# Patient Record
Sex: MALE | Race: BLACK OR AFRICAN AMERICAN | NOT HISPANIC OR LATINO | Employment: UNEMPLOYED | URBAN - METROPOLITAN AREA
[De-identification: names, ages, dates, MRNs, and addresses within clinical notes are randomized per-mention and may not be internally consistent; named-entity substitution may affect disease eponyms.]

---

## 2017-06-01 ENCOUNTER — HOSPITAL ENCOUNTER (EMERGENCY)
Facility: HOSPITAL | Age: 62
Discharge: HOME | End: 2017-06-01
Attending: EMERGENCY MEDICINE
Payer: MEDICAID

## 2017-06-01 VITALS
TEMPERATURE: 98 F | WEIGHT: 291 LBS | RESPIRATION RATE: 20 BRPM | DIASTOLIC BLOOD PRESSURE: 60 MMHG | BODY MASS INDEX: 38.57 KG/M2 | OXYGEN SATURATION: 97 % | HEIGHT: 73 IN | HEART RATE: 72 BPM | SYSTOLIC BLOOD PRESSURE: 156 MMHG

## 2017-06-01 DIAGNOSIS — T14.8XXA SPLINTER IN SKIN: Primary | ICD-10-CM

## 2017-06-01 PROCEDURE — 25000003 PHARM REV CODE 250: Performed by: EMERGENCY MEDICINE

## 2017-06-01 PROCEDURE — 99283 EMERGENCY DEPT VISIT LOW MDM: CPT | Mod: 25

## 2017-06-01 PROCEDURE — 64450 NJX AA&/STRD OTHER PN/BRANCH: CPT | Mod: F3

## 2017-06-01 RX ORDER — HYDROCODONE BITARTRATE AND ACETAMINOPHEN 10; 325 MG/1; MG/1
1 TABLET ORAL EVERY 6 HOURS PRN
Qty: 12 TABLET | Refills: 0 | Status: SHIPPED | OUTPATIENT
Start: 2017-06-01 | End: 2018-11-25 | Stop reason: ALTCHOICE

## 2017-06-01 RX ORDER — LIDOCAINE HYDROCHLORIDE 10 MG/ML
5 INJECTION INFILTRATION; PERINEURAL
Status: COMPLETED | OUTPATIENT
Start: 2017-06-01 | End: 2017-06-01

## 2017-06-01 RX ORDER — CEPHALEXIN 250 MG/1
250 CAPSULE ORAL 4 TIMES DAILY
Qty: 28 CAPSULE | Refills: 0 | Status: SHIPPED | OUTPATIENT
Start: 2017-06-01 | End: 2017-06-08

## 2017-06-01 RX ADMIN — LIDOCAINE HYDROCHLORIDE 5 ML: 10 INJECTION, SOLUTION INFILTRATION; PERINEURAL at 01:06

## 2017-06-01 RX ADMIN — BACITRACIN, NEOMYCIN, POLYMYXIN B 1 EACH: 400; 3.5; 5 OINTMENT TOPICAL at 02:06
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
## 2018-10-30 ENCOUNTER — HOSPITAL ENCOUNTER (EMERGENCY)
Facility: HOSPITAL | Age: 63
Discharge: HOME | End: 2018-10-30
Attending: FAMILY MEDICINE
Payer: MEDICAID

## 2018-10-30 VITALS
HEART RATE: 74 BPM | HEIGHT: 73 IN | BODY MASS INDEX: 38.13 KG/M2 | RESPIRATION RATE: 22 BRPM | TEMPERATURE: 99 F | WEIGHT: 287.69 LBS | DIASTOLIC BLOOD PRESSURE: 78 MMHG | SYSTOLIC BLOOD PRESSURE: 187 MMHG | OXYGEN SATURATION: 100 %

## 2018-10-30 DIAGNOSIS — R06.02 SHORTNESS OF BREATH: ICD-10-CM

## 2018-10-30 DIAGNOSIS — I16.0 HYPERTENSIVE URGENCY: ICD-10-CM

## 2018-10-30 DIAGNOSIS — I50.9 ACUTE CONGESTIVE HEART FAILURE, UNSPECIFIED HEART FAILURE TYPE: Primary | ICD-10-CM

## 2018-10-30 DIAGNOSIS — R07.9 CHEST PAIN: ICD-10-CM

## 2018-10-30 LAB
ALBUMIN SERPL BCP-MCNC: 4.3 G/DL
ALP SERPL-CCNC: 107 U/L
ALT SERPL W/O P-5'-P-CCNC: 12 U/L
ANION GAP SERPL CALC-SCNC: 13 MMOL/L
AST SERPL-CCNC: 18 U/L
BASOPHILS # BLD AUTO: 0.02 K/UL
BASOPHILS NFR BLD: 0.3 %
BILIRUB SERPL-MCNC: 0.6 MG/DL
BUN SERPL-MCNC: 13 MG/DL
CALCIUM SERPL-MCNC: 9.1 MG/DL
CHLORIDE SERPL-SCNC: 104 MMOL/L
CO2 SERPL-SCNC: 23 MMOL/L
CREAT SERPL-MCNC: 0.98 MG/DL
D DIMER PPP FEU-MCNC: 290 NG/ML
DIFFERENTIAL METHOD: ABNORMAL
EOSINOPHIL # BLD AUTO: 0.3 K/UL
EOSINOPHIL NFR BLD: 3.8 %
ERYTHROCYTE [DISTWIDTH] IN BLOOD BY AUTOMATED COUNT: 16.2 %
EST. GFR  (AFRICAN AMERICAN): >60 ML/MIN/1.73 M^2
EST. GFR  (NON AFRICAN AMERICAN): >60 ML/MIN/1.73 M^2
GLUCOSE SERPL-MCNC: 150 MG/DL
HCT VFR BLD AUTO: 40.2 %
HGB BLD-MCNC: 13 G/DL
INR PPP: 1.1
LACTATE SERPL-SCNC: 1.4 MMOL/L
LYMPHOCYTES # BLD AUTO: 3.6 K/UL
LYMPHOCYTES NFR BLD: 53.6 %
MCH RBC QN AUTO: 23.4 PG
MCHC RBC AUTO-ENTMCNC: 32.3 G/DL
MCV RBC AUTO: 72 FL
MONOCYTES # BLD AUTO: 0.6 K/UL
MONOCYTES NFR BLD: 8.8 %
NEUTROPHILS # BLD AUTO: 2.2 K/UL
NEUTROPHILS NFR BLD: 33.3 %
NT-PROBNP: 1800 PG/ML
PLATELET # BLD AUTO: 317 K/UL
PMV BLD AUTO: 10.1 FL
POTASSIUM SERPL-SCNC: 3.5 MMOL/L
PROT SERPL-MCNC: 7.8 G/DL
PROTHROMBIN TIME: 12 SEC
RBC # BLD AUTO: 5.56 M/UL
SODIUM SERPL-SCNC: 140 MMOL/L
TROPONIN I SERPL DL<=0.01 NG/ML-MCNC: 0.01 NG/ML
WBC # BLD AUTO: 6.62 K/UL

## 2018-10-30 PROCEDURE — 83605 ASSAY OF LACTIC ACID: CPT

## 2018-10-30 PROCEDURE — 99285 EMERGENCY DEPT VISIT HI MDM: CPT | Mod: 25

## 2018-10-30 PROCEDURE — 63600175 PHARM REV CODE 636 W HCPCS: Performed by: FAMILY MEDICINE

## 2018-10-30 PROCEDURE — 83880 ASSAY OF NATRIURETIC PEPTIDE: CPT

## 2018-10-30 PROCEDURE — 87040 BLOOD CULTURE FOR BACTERIA: CPT | Mod: 59

## 2018-10-30 PROCEDURE — 93005 ELECTROCARDIOGRAM TRACING: CPT

## 2018-10-30 PROCEDURE — 80053 COMPREHEN METABOLIC PANEL: CPT

## 2018-10-30 PROCEDURE — 96374 THER/PROPH/DIAG INJ IV PUSH: CPT

## 2018-10-30 PROCEDURE — 96375 TX/PRO/DX INJ NEW DRUG ADDON: CPT

## 2018-10-30 PROCEDURE — 25000003 PHARM REV CODE 250: Performed by: FAMILY MEDICINE

## 2018-10-30 PROCEDURE — 85610 PROTHROMBIN TIME: CPT

## 2018-10-30 PROCEDURE — 85025 COMPLETE CBC W/AUTO DIFF WBC: CPT

## 2018-10-30 PROCEDURE — 84484 ASSAY OF TROPONIN QUANT: CPT

## 2018-10-30 PROCEDURE — 93010 ELECTROCARDIOGRAM REPORT: CPT | Mod: ,,, | Performed by: INTERNAL MEDICINE

## 2018-10-30 PROCEDURE — 85379 FIBRIN DEGRADATION QUANT: CPT

## 2018-10-30 PROCEDURE — 96376 TX/PRO/DX INJ SAME DRUG ADON: CPT

## 2018-10-30 RX ORDER — ENALAPRILAT 1.25 MG/ML
2.5 INJECTION INTRAVENOUS
Status: COMPLETED | OUTPATIENT
Start: 2018-10-30 | End: 2018-10-30

## 2018-10-30 RX ORDER — FUROSEMIDE 10 MG/ML
40 INJECTION INTRAMUSCULAR; INTRAVENOUS
Status: COMPLETED | OUTPATIENT
Start: 2018-10-30 | End: 2018-10-30

## 2018-10-30 RX ORDER — ASPIRIN 325 MG
325 TABLET ORAL
Status: COMPLETED | OUTPATIENT
Start: 2018-10-30 | End: 2018-10-30

## 2018-10-30 RX ORDER — FUROSEMIDE 40 MG/1
40 TABLET ORAL DAILY
Qty: 5 TABLET | Refills: 0 | Status: SHIPPED | OUTPATIENT
Start: 2018-10-30 | End: 2018-11-25 | Stop reason: ALTCHOICE

## 2018-10-30 RX ADMIN — ENALAPRILAT 2.5 MG: 1.25 INJECTION, SOLUTION INTRAVENOUS at 04:10

## 2018-10-30 RX ADMIN — FUROSEMIDE 40 MG: 10 INJECTION, SOLUTION INTRAMUSCULAR; INTRAVENOUS at 04:10

## 2018-10-30 RX ADMIN — ASPIRIN 325 MG ORAL TABLET 325 MG: 325 PILL ORAL at 04:10

## 2018-10-30 RX ADMIN — FUROSEMIDE 40 MG: 10 INJECTION, SOLUTION INTRAMUSCULAR; INTRAVENOUS at 05:10

## 2018-10-30 RX ADMIN — NITROGLYCERIN 1 INCH: 20 OINTMENT TOPICAL at 05:10

## 2018-10-30 NOTE — ED PROVIDER NOTES
Encounter Date: 10/30/2018       History     Chief Complaint   Patient presents with    Cough     c/o cough that started tonight after waking up. Cough is productive.      63-year-old male presents with chief complaint of cough for the last 2 days.  Reports has been coughing up some cold over last 1 day.  Patient also reports had a brief episode of chest pain.  Reports does have a history of diabetes and hypertension.  Upon further questioning does report has episodes of sleep apnea.          Review of patient's allergies indicates:  No Known Allergies  Past Medical History:   Diagnosis Date    Diabetes mellitus     High cholesterol     Hypertension      History reviewed. No pertinent surgical history.  History reviewed. No pertinent family history.  Social History     Tobacco Use    Smoking status: Never Smoker   Substance Use Topics    Alcohol use: Yes     Comment: socially    Drug use: No     Review of Systems   Constitutional: Negative for chills and fever.   Respiratory: Positive for cough and shortness of breath. Negative for wheezing.    Cardiovascular: Positive for chest pain. Negative for leg swelling.   Gastrointestinal: Negative for abdominal pain, nausea and vomiting.   All other systems reviewed and are negative.      Physical Exam     Initial Vitals [10/30/18 0407]   BP Pulse Resp Temp SpO2   (!) 240/109 106 20 99.1 °F (37.3 °C) 95 %      MAP       --         Physical Exam    Nursing note and vitals reviewed.  Constitutional: He appears well-developed and well-nourished.   HENT:   Head: Normocephalic and atraumatic.   Eyes: Conjunctivae and EOM are normal. Pupils are equal, round, and reactive to light.   Neck: Normal range of motion. Neck supple.   Cardiovascular: Normal rate, regular rhythm and intact distal pulses. Exam reveals gallop.    Pulmonary/Chest: Breath sounds normal.   Abdominal: Soft. Bowel sounds are normal.   Musculoskeletal: Normal range of motion.   Neurological: He is alert  and oriented to person, place, and time. He has normal strength. GCS score is 15. GCS eye subscore is 4. GCS verbal subscore is 5. GCS motor subscore is 6.   Skin: Skin is warm. Capillary refill takes less than 2 seconds.   Psychiatric: He has a normal mood and affect. His behavior is normal. Thought content normal.         ED Course   Critical Care  Date/Time: 11/5/2018 11:11 PM  Performed by: Jose Magdaleno MD  Authorized by: Jose Magdaleno MD   Direct patient critical care time: 20 minutes  Additional history critical care time: 5 minutes  Documentation critical care time: 7 minutes  Total critical care time (exclusive of procedural time) : 32 minutes        Labs Reviewed   CBC W/ AUTO DIFFERENTIAL - Abnormal; Notable for the following components:       Result Value    Hemoglobin 13.0 (*)     MCV 72 (*)     MCH 23.4 (*)     RDW 16.2 (*)     Gran% 33.3 (*)     Lymph% 53.6 (*)     All other components within normal limits   COMPREHENSIVE METABOLIC PANEL - Abnormal; Notable for the following components:    Glucose 150 (*)     All other components within normal limits   NT-PRO NATRIURETIC PEPTIDE - Abnormal; Notable for the following components:    NT-proBNP 1800 (*)     All other components within normal limits   D DIMER - Abnormal; Notable for the following components:    D-Dimer 290 (*)     All other components within normal limits   CULTURE, BLOOD   CULTURE, BLOOD   TROPONIN I   PROTIME-INR   LACTIC ACID, PLASMA   TROPONIN I     EKG Readings: (Independently Interpreted)   Initial Reading: No STEMI.   Sinus rhythm with first-degree AV block at 87 beats AL interval 212 milliseconds normal QRS left axis deviation with LVH no acute ST segment changes noted       Imaging Results          X-Ray Chest AP Portable (In process)                  Medical Decision Making:   Initial Assessment:   Wan obese black male no apparent distress presenting with a dry nonproductive cough.  Differential Diagnosis:   Pneumonia,  bronchitis, congestive heart failure, asthma.  Clinical Tests:   Lab Tests: Ordered and Reviewed  The following lab test(s) were unremarkable: CBC, CMP and BNP       <> Summary of Lab: Elevated BNP with a normal troponin  Radiological Study: Ordered and Reviewed                      Clinical Impression:   The primary encounter diagnosis was Acute congestive heart failure, unspecified heart failure type. Diagnoses of Chest pain, Shortness of breath, and Hypertensive urgency were also pertinent to this visit.                             Jose Magdaleno MD  11/05/18 [id number]

## 2018-11-04 LAB
BACTERIA BLD CULT: NORMAL
BACTERIA BLD CULT: NORMAL

## 2018-11-25 ENCOUNTER — HOSPITAL ENCOUNTER (EMERGENCY)
Facility: HOSPITAL | Age: 63
Discharge: HOME | End: 2018-11-25
Attending: EMERGENCY MEDICINE
Payer: MEDICAID

## 2018-11-25 VITALS
OXYGEN SATURATION: 99 % | RESPIRATION RATE: 20 BRPM | TEMPERATURE: 99 F | SYSTOLIC BLOOD PRESSURE: 192 MMHG | HEART RATE: 94 BPM | WEIGHT: 281 LBS | DIASTOLIC BLOOD PRESSURE: 82 MMHG | BODY MASS INDEX: 38.06 KG/M2 | HEIGHT: 72 IN

## 2018-11-25 DIAGNOSIS — I10 ESSENTIAL HYPERTENSION: Primary | ICD-10-CM

## 2018-11-25 DIAGNOSIS — R00.2 PALPITATIONS: ICD-10-CM

## 2018-11-25 DIAGNOSIS — R07.9 CHEST PAIN: ICD-10-CM

## 2018-11-25 LAB
ALBUMIN SERPL BCP-MCNC: 4.5 G/DL
ALP SERPL-CCNC: 84 U/L
ALT SERPL W/O P-5'-P-CCNC: 18 U/L
ANION GAP SERPL CALC-SCNC: 8 MMOL/L
AST SERPL-CCNC: 22 U/L
BASOPHILS # BLD AUTO: 0.03 K/UL
BASOPHILS NFR BLD: 0.5 %
BILIRUB SERPL-MCNC: 0.3 MG/DL
BUN SERPL-MCNC: 12 MG/DL
CALCIUM SERPL-MCNC: 9.4 MG/DL
CHLORIDE SERPL-SCNC: 103 MMOL/L
CO2 SERPL-SCNC: 30 MMOL/L
CREAT SERPL-MCNC: 1.05 MG/DL
DIFFERENTIAL METHOD: ABNORMAL
EOSINOPHIL # BLD AUTO: 0.2 K/UL
EOSINOPHIL NFR BLD: 3.3 %
ERYTHROCYTE [DISTWIDTH] IN BLOOD BY AUTOMATED COUNT: 16.9 %
EST. GFR  (AFRICAN AMERICAN): >60 ML/MIN/1.73 M^2
EST. GFR  (NON AFRICAN AMERICAN): >60 ML/MIN/1.73 M^2
ETHANOL SERPL-MCNC: 14 MG/DL
GLUCOSE SERPL-MCNC: 152 MG/DL
HCT VFR BLD AUTO: 39.9 %
HGB BLD-MCNC: 13 G/DL
LYMPHOCYTES # BLD AUTO: 4 K/UL
LYMPHOCYTES NFR BLD: 60.5 %
MAGNESIUM SERPL-MCNC: 1.7 MG/DL
MCH RBC QN AUTO: 23.6 PG
MCHC RBC AUTO-ENTMCNC: 32.6 G/DL
MCV RBC AUTO: 73 FL
MONOCYTES # BLD AUTO: 0.7 K/UL
MONOCYTES NFR BLD: 9.8 %
NEUTROPHILS # BLD AUTO: 1.7 K/UL
NEUTROPHILS NFR BLD: 25.9 %
NT-PROBNP: 596 PG/ML
PLATELET # BLD AUTO: 287 K/UL
PMV BLD AUTO: 9.4 FL
POTASSIUM SERPL-SCNC: 3.7 MMOL/L
PROT SERPL-MCNC: 8 G/DL
RBC # BLD AUTO: 5.5 M/UL
SODIUM SERPL-SCNC: 141 MMOL/L
TROPONIN I SERPL DL<=0.01 NG/ML-MCNC: 0.02 NG/ML
TSH SERPL DL<=0.005 MIU/L-ACNC: 2.42 UIU/ML
WBC # BLD AUTO: 6.63 K/UL

## 2018-11-25 PROCEDURE — 80053 COMPREHEN METABOLIC PANEL: CPT

## 2018-11-25 PROCEDURE — 93010 ELECTROCARDIOGRAM REPORT: CPT | Mod: ,,, | Performed by: STUDENT IN AN ORGANIZED HEALTH CARE EDUCATION/TRAINING PROGRAM

## 2018-11-25 PROCEDURE — 93005 ELECTROCARDIOGRAM TRACING: CPT

## 2018-11-25 PROCEDURE — 84443 ASSAY THYROID STIM HORMONE: CPT

## 2018-11-25 PROCEDURE — 25000003 PHARM REV CODE 250: Performed by: EMERGENCY MEDICINE

## 2018-11-25 PROCEDURE — 99285 EMERGENCY DEPT VISIT HI MDM: CPT | Mod: 25

## 2018-11-25 PROCEDURE — 94760 N-INVAS EAR/PLS OXIMETRY 1: CPT

## 2018-11-25 PROCEDURE — 83880 ASSAY OF NATRIURETIC PEPTIDE: CPT

## 2018-11-25 PROCEDURE — 84484 ASSAY OF TROPONIN QUANT: CPT

## 2018-11-25 PROCEDURE — 85025 COMPLETE CBC W/AUTO DIFF WBC: CPT

## 2018-11-25 PROCEDURE — 80320 DRUG SCREEN QUANTALCOHOLS: CPT

## 2018-11-25 PROCEDURE — 96360 HYDRATION IV INFUSION INIT: CPT

## 2018-11-25 PROCEDURE — 83735 ASSAY OF MAGNESIUM: CPT

## 2018-11-25 RX ORDER — CLONIDINE HYDROCHLORIDE 0.1 MG/1
0.1 TABLET ORAL
Status: COMPLETED | OUTPATIENT
Start: 2018-11-25 | End: 2018-11-25

## 2018-11-25 RX ORDER — METOPROLOL TARTRATE 1 MG/ML
5 INJECTION, SOLUTION INTRAVENOUS
Status: DISCONTINUED | OUTPATIENT
Start: 2018-11-25 | End: 2018-11-25 | Stop reason: HOSPADM

## 2018-11-25 RX ORDER — ASPIRIN 325 MG
325 TABLET ORAL
Status: COMPLETED | OUTPATIENT
Start: 2018-11-25 | End: 2018-11-25

## 2018-11-25 RX ORDER — LOSARTAN POTASSIUM 100 MG/1
100 TABLET ORAL DAILY
Qty: 30 TABLET | Refills: 0 | Status: SHIPPED | OUTPATIENT
Start: 2018-11-25 | End: 2019-11-05

## 2018-11-25 RX ORDER — ATORVASTATIN CALCIUM 40 MG/1
40 TABLET, FILM COATED ORAL DAILY
COMMUNITY

## 2018-11-25 RX ADMIN — ASPIRIN 325 MG ORAL TABLET 325 MG: 325 PILL ORAL at 02:11

## 2018-11-25 RX ADMIN — SODIUM CHLORIDE 500 ML: 0.9 INJECTION, SOLUTION INTRAVENOUS at 02:11

## 2018-11-25 RX ADMIN — CLONIDINE HYDROCHLORIDE 0.1 MG: 0.1 TABLET ORAL at 02:11

## 2018-11-25 NOTE — ED NOTES
Metoprolol 5 mg IV push held at this time pending discussion of pts trending vitals with MD. Heart rate and blood pressure currently trending down. Will discuss vital signs after new set is obtained at 0230 with MD in order to determine whether medication is to be administered.

## 2018-11-25 NOTE — ED PROVIDER NOTES
Encounter Date: 11/25/2018       History     Chief Complaint   Patient presents with    Palpitations     Patient states he woke up at 0130 and it feels like his heart is racing.   He denies any CP or SOB.      Chief Complaint:  Heart racing and high blood pressure      The patient is a 63 y.o. male presenting with heart racing high blood pressure.  Patient reports he is out of his blood pressure medicine and he has been drinking a lot alcohol recently.  Patient feels like he might be going through withdrawal.    Associated symptoms include heart racing and anxiety.    No exacerbating or alleviating factors.     Denies chest pain,.  Fever/chills/nausea/vomiting/diarrhea    Patient has a past medical history of Diabetes mellitus, High cholesterol, and Hypertension.  Patient has no past surgical history on file.          Review of patient's allergies indicates:  No Known Allergies  Past Medical History:   Diagnosis Date    Diabetes mellitus     High cholesterol     Hypertension      History reviewed. No pertinent surgical history.  History reviewed. No pertinent family history.  Social History     Tobacco Use    Smoking status: Never Smoker    Smokeless tobacco: Never Used   Substance Use Topics    Alcohol use: Yes     Alcohol/week: 2.4 oz     Types: 4 Shots of liquor per week     Comment: daily    Drug use: No     Review of Systems   Cardiovascular: Positive for palpitations.   Psychiatric/Behavioral: The patient is nervous/anxious.    All other systems reviewed and are negative.      Physical Exam     Initial Vitals [11/25/18 0203]   BP Pulse Resp Temp SpO2   (!) 219/102 (!) 135 20 98.5 °F (36.9 °C) 99 %      MAP       --         Physical Exam    Nursing note and vitals reviewed.  Constitutional: He appears well-developed. He appears distressed.   Anxious appearing   HENT:   Head: Normocephalic and atraumatic.   Right Ear: External ear normal.   Left Ear: External ear normal.   Nose: Nose normal.    Mouth/Throat: Oropharynx is clear and moist.   Eyes: Conjunctivae and EOM are normal. Pupils are equal, round, and reactive to light.   Neck: Normal range of motion. Neck supple.   Cardiovascular: Regular rhythm, normal heart sounds and intact distal pulses.   Tachycardic rate of 110   Pulmonary/Chest: Breath sounds normal.   Abdominal: Soft. Bowel sounds are normal.   Musculoskeletal: Normal range of motion.   Neurological: He is alert. GCS score is 15. GCS eye subscore is 4. GCS verbal subscore is 5. GCS motor subscore is 6.   Skin: Skin is warm. Capillary refill takes 2 to 3 seconds.         ED Course   Procedures  Labs Reviewed   CBC W/ AUTO DIFFERENTIAL - Abnormal; Notable for the following components:       Result Value    Hemoglobin 13.0 (*)     Hematocrit 39.9 (*)     MCV 73 (*)     MCH 23.6 (*)     RDW 16.9 (*)     Gran # (ANC) 1.7 (*)     Gran% 25.9 (*)     Lymph% 60.5 (*)     All other components within normal limits   COMPREHENSIVE METABOLIC PANEL - Abnormal; Notable for the following components:    CO2 30 (*)     Glucose 152 (*)     All other components within normal limits   ALCOHOL,MEDICAL (ETHANOL) - Abnormal; Notable for the following components:    Alcohol, Medical, Serum 14 (*)     All other components within normal limits   TROPONIN I   TSH   MAGNESIUM   NT-PRO NATRIURETIC PEPTIDE   TROPONIN I   DRUG SCREEN PANEL, URINE EMERGENCY          Imaging Results          X-Ray Chest AP Portable (In process)                       Results for orders placed or performed during the hospital encounter of 11/25/18   CBC auto differential   Result Value Ref Range    WBC 6.63 3.90 - 12.70 K/uL    RBC 5.50 4.60 - 6.20 M/uL    Hemoglobin 13.0 (L) 14.0 - 18.0 g/dL    Hematocrit 39.9 (L) 40.0 - 54.0 %    MCV 73 (L) 82 - 98 fL    MCH 23.6 (L) 27.0 - 31.0 pg    MCHC 32.6 32.0 - 36.0 g/dL    RDW 16.9 (H) 11.5 - 14.5 %    Platelets 287 150 - 350 K/uL    MPV 9.4 9.2 - 12.9 fL    Gran # (ANC) 1.7 (L) 1.8 - 7.7 K/uL     Lymph # 4.0 1.0 - 4.8 K/uL    Mono # 0.7 0.3 - 1.0 K/uL    Eos # 0.2 0.0 - 0.5 K/uL    Baso # 0.03 0.00 - 0.20 K/uL    Gran% 25.9 (L) 38.0 - 73.0 %    Lymph% 60.5 (H) 18.0 - 48.0 %    Mono% 9.8 4.0 - 15.0 %    Eosinophil% 3.3 0.0 - 8.0 %    Basophil% 0.5 0.0 - 1.9 %    Differential Method Automated    Comprehensive metabolic panel   Result Value Ref Range    Sodium 141 136 - 145 mmol/L    Potassium 3.7 3.5 - 5.1 mmol/L    Chloride 103 95 - 110 mmol/L    CO2 30 (H) 23 - 29 mmol/L    Glucose 152 (H) 70 - 110 mg/dL    BUN, Bld 12 2 - 20 mg/dL    Creatinine 1.05 0.50 - 1.40 mg/dL    Calcium 9.4 8.7 - 10.5 mg/dL    Total Protein 8.0 6.0 - 8.4 g/dL    Albumin 4.5 3.5 - 5.2 g/dL    Total Bilirubin 0.3 0.1 - 1.0 mg/dL    Alkaline Phosphatase 84 38 - 126 U/L    AST 22 15 - 46 U/L    ALT 18 10 - 44 U/L    Anion Gap 8 8 - 16 mmol/L    eGFR if African American >60.0 >60 mL/min/1.73 m^2    eGFR if non African American >60.0 >60 mL/min/1.73 m^2   Troponin I #1   Result Value Ref Range    Troponin I 0.016 0.012 - 0.034 ng/mL   TSH   Result Value Ref Range    TSH 2.420 0.400 - 4.000 uIU/mL   Magnesium   Result Value Ref Range    Magnesium 1.7 1.6 - 2.6 mg/dL   Ethanol   Result Value Ref Range    Alcohol, Medical, Serum 14 (H) <10 mg/dL   NT-Pro Natriuretic Peptide   Result Value Ref Range    NT-proBNP 596 5 - 900 pg/mL                         Clinical Impression:   The primary encounter diagnosis was Essential hypertension. Diagnoses of Chest pain and Palpitations were also pertinent to this visit.                             Dev Desai MD  11/25/18 0421

## 2019-06-30 ENCOUNTER — HOSPITAL ENCOUNTER (EMERGENCY)
Facility: HOSPITAL | Age: 64
Discharge: HOME | End: 2019-06-30
Attending: SURGERY
Payer: MEDICARE

## 2019-06-30 VITALS
TEMPERATURE: 98 F | DIASTOLIC BLOOD PRESSURE: 87 MMHG | RESPIRATION RATE: 6 BRPM | SYSTOLIC BLOOD PRESSURE: 178 MMHG | BODY MASS INDEX: 38.6 KG/M2 | OXYGEN SATURATION: 96 % | HEART RATE: 65 BPM | WEIGHT: 285 LBS | HEIGHT: 72 IN

## 2019-06-30 DIAGNOSIS — I49.1 PAC (PREMATURE ATRIAL CONTRACTION): Primary | ICD-10-CM

## 2019-06-30 DIAGNOSIS — R00.2 PALPITATIONS: ICD-10-CM

## 2019-06-30 LAB
ALBUMIN SERPL BCP-MCNC: 4.1 G/DL (ref 3.5–5.2)
ALP SERPL-CCNC: 79 U/L (ref 38–126)
ALT SERPL W/O P-5'-P-CCNC: 15 U/L (ref 10–44)
ANION GAP SERPL CALC-SCNC: 10 MMOL/L (ref 8–16)
AST SERPL-CCNC: 24 U/L (ref 15–46)
BASOPHILS # BLD AUTO: 0.01 K/UL (ref 0–0.2)
BASOPHILS NFR BLD: 0.2 % (ref 0–1.9)
BILIRUB SERPL-MCNC: 0.4 MG/DL (ref 0.1–1)
BUN SERPL-MCNC: 10 MG/DL (ref 2–20)
CALCIUM SERPL-MCNC: 9.4 MG/DL (ref 8.7–10.5)
CHLORIDE SERPL-SCNC: 103 MMOL/L (ref 95–110)
CO2 SERPL-SCNC: 27 MMOL/L (ref 23–29)
CREAT SERPL-MCNC: 0.98 MG/DL (ref 0.5–1.4)
DIFFERENTIAL METHOD: ABNORMAL
EOSINOPHIL # BLD AUTO: 0.2 K/UL (ref 0–0.5)
EOSINOPHIL NFR BLD: 3.4 % (ref 0–8)
ERYTHROCYTE [DISTWIDTH] IN BLOOD BY AUTOMATED COUNT: 16.5 % (ref 11.5–14.5)
EST. GFR  (AFRICAN AMERICAN): >60 ML/MIN/1.73 M^2
EST. GFR  (NON AFRICAN AMERICAN): >60 ML/MIN/1.73 M^2
GLUCOSE SERPL-MCNC: 127 MG/DL (ref 70–110)
HCT VFR BLD AUTO: 38.6 % (ref 40–54)
HGB BLD-MCNC: 12.6 G/DL (ref 14–18)
LYMPHOCYTES # BLD AUTO: 2.3 K/UL (ref 1–4.8)
LYMPHOCYTES NFR BLD: 40.9 % (ref 18–48)
MCH RBC QN AUTO: 23.6 PG (ref 27–31)
MCHC RBC AUTO-ENTMCNC: 32.6 G/DL (ref 32–36)
MCV RBC AUTO: 72 FL (ref 82–98)
MONOCYTES # BLD AUTO: 0.6 K/UL (ref 0.3–1)
MONOCYTES NFR BLD: 10.4 % (ref 4–15)
NEUTROPHILS # BLD AUTO: 2.6 K/UL (ref 1.8–7.7)
NEUTROPHILS NFR BLD: 45.1 % (ref 38–73)
NT-PROBNP: 590 PG/ML (ref 5–900)
PLATELET # BLD AUTO: 302 K/UL (ref 150–350)
PMV BLD AUTO: 9.9 FL (ref 9.2–12.9)
POTASSIUM SERPL-SCNC: 4 MMOL/L (ref 3.5–5.1)
PROT SERPL-MCNC: 7.5 G/DL (ref 6–8.4)
RBC # BLD AUTO: 5.35 M/UL (ref 4.6–6.2)
SODIUM SERPL-SCNC: 140 MMOL/L (ref 136–145)
TROPONIN I SERPL DL<=0.01 NG/ML-MCNC: <0.012 NG/ML (ref 0.01–0.03)
WBC # BLD AUTO: 5.67 K/UL (ref 3.9–12.7)

## 2019-06-30 PROCEDURE — 99285 EMERGENCY DEPT VISIT HI MDM: CPT | Mod: ER

## 2019-06-30 PROCEDURE — 25000003 PHARM REV CODE 250: Mod: ER | Performed by: SURGERY

## 2019-06-30 PROCEDURE — 80053 COMPREHEN METABOLIC PANEL: CPT | Mod: ER

## 2019-06-30 PROCEDURE — 83880 ASSAY OF NATRIURETIC PEPTIDE: CPT | Mod: ER

## 2019-06-30 PROCEDURE — 85025 COMPLETE CBC W/AUTO DIFF WBC: CPT | Mod: ER

## 2019-06-30 PROCEDURE — 84484 ASSAY OF TROPONIN QUANT: CPT | Mod: ER

## 2019-06-30 RX ORDER — LISINOPRIL 10 MG/1
10 TABLET ORAL DAILY
COMMUNITY
End: 2019-11-05

## 2019-06-30 RX ORDER — SODIUM CHLORIDE 9 MG/ML
1000 INJECTION, SOLUTION INTRAVENOUS
Status: COMPLETED | OUTPATIENT
Start: 2019-06-30 | End: 2019-06-30

## 2019-06-30 RX ORDER — ATENOLOL 50 MG/1
50 TABLET ORAL DAILY
COMMUNITY
End: 2019-10-15

## 2019-06-30 RX ORDER — CLONIDINE HYDROCHLORIDE 0.2 MG/1
0.2 TABLET ORAL
Status: COMPLETED | OUTPATIENT
Start: 2019-06-30 | End: 2019-06-30

## 2019-06-30 RX ADMIN — CLONIDINE HYDROCHLORIDE 0.2 MG: 0.2 TABLET ORAL at 12:06

## 2019-06-30 RX ADMIN — SODIUM CHLORIDE 1000 ML: 0.9 INJECTION, SOLUTION INTRAVENOUS at 12:06

## 2019-06-30 NOTE — ED PROVIDER NOTES
"Encounter Date: 6/30/2019       History     Chief Complaint   Patient presents with    Palpitations     Pt c/o "heart racing and palpitations" since waking this am.  States was unable to obtain blood pressure reading at home, states machine "kept showing error".  Pt denies SOB, denies HA, denies CP. Skin w/d.      Patient complains of intermittent palpitations this morning when he woke up.  He sleeps on his side and states that when he sleeps on his side he gets a lot of palpitations.  He denies substernal chest pain or radiation to shoulder.  Has a history of diabetes and hypertension but no cardiac history    The history is provided by the patient.   Palpitations    This is a new problem. The current episode started 3 to 5 hours ago. The problem occurs intermittently. The problem has been unchanged. On average, each episode lasts 3 hours. Associated symptoms include irregular heartbeat. Pertinent negatives include no diaphoresis and no chest pressure. He has tried nothing for the symptoms. The treatment provided no relief. There are no known risk factors.     Review of patient's allergies indicates:  No Known Allergies  Past Medical History:   Diagnosis Date    Diabetes mellitus     High cholesterol     Hypertension      History reviewed. No pertinent surgical history.  History reviewed. No pertinent family history.  Social History     Tobacco Use    Smoking status: Never Smoker    Smokeless tobacco: Never Used   Substance Use Topics    Alcohol use: Yes     Alcohol/week: 2.4 oz     Types: 4 Shots of liquor per week     Comment: daily    Drug use: No     Review of Systems   Constitutional: Negative.  Negative for diaphoresis.   HENT: Negative.    Eyes: Negative.    Respiratory: Negative.    Cardiovascular: Positive for palpitations.   Endocrine: Negative.    Genitourinary: Negative.    Musculoskeletal: Negative.    Skin: Negative.    Allergic/Immunologic: Negative.    Neurological: Negative.  "   Hematological: Negative.    Psychiatric/Behavioral: Negative.  Negative for agitation and hallucinations.       Physical Exam     Initial Vitals [06/30/19 1202]   BP Pulse Resp Temp SpO2   (!) 215/96 74 18 98.4 °F (36.9 °C) 96 %      MAP       --         Physical Exam    Nursing note and vitals reviewed.  Constitutional: He appears well-developed and well-nourished.   HENT:   Head: Normocephalic.   Eyes: Conjunctivae are normal.   Neck: Normal range of motion.   Cardiovascular: Normal rate and intact distal pulses.   Pulmonary/Chest: Breath sounds normal.   Musculoskeletal: Normal range of motion.   Neurological: He is alert and oriented to person, place, and time.   Skin: Skin is warm.   Psychiatric: He has a normal mood and affect.         ED Course   Procedures  Labs Reviewed   COMPREHENSIVE METABOLIC PANEL - Abnormal; Notable for the following components:       Result Value    Glucose 127 (*)     All other components within normal limits   CBC W/ AUTO DIFFERENTIAL - Abnormal; Notable for the following components:    Hemoglobin 12.6 (*)     Hematocrit 38.6 (*)     Mean Corpuscular Volume 72 (*)     Mean Corpuscular Hemoglobin 23.6 (*)     RDW 16.5 (*)     All other components within normal limits   TROPONIN I   NT-PRO NATRIURETIC PEPTIDE     EKG Readings: (Independently Interpreted)   Rhythm: Normal Sinus Rhythm. Ectopy: PACs. Axis: Left Axis Deviation. Other Findings: Prolonged QT Interval.   Nonspecific T-wave changes in lateral leads.  No change from 10/30/2018       Imaging Results          X-Ray Chest AP Portable (Final result)  Result time 06/30/19 12:59:01    Final result by Mu Márquez MD (06/30/19 12:59:01)                 Impression:      No acute process seen.      Electronically signed by: Mu Márquez MD  Date:    06/30/2019  Time:    12:59             Narrative:    EXAMINATION:  XR CHEST AP PORTABLE    CLINICAL HISTORY:  Palpitation;    FINDINGS:  Single view of the chest.    Cardiac  silhouette is normal.  The lungs demonstrate no evidence of active disease.  No evidence of pleural effusion or pneumothorax.  Bones appear intact.                                 Medical Decision Making:   Initial Assessment:   Palpitations probable PACs  ED Management:  Cardiac exam and laboratory exam is normal. Only abnormality is multiple PACs                      Clinical Impression:       ICD-10-CM ICD-9-CM   1. PAC (premature atrial contraction) I49.1 427.61   2. Palpitations R00.2 785.1         Disposition:   Disposition: Discharged  Condition: Stable                        KWESI Riddle III, MD  06/30/19 [id number]

## 2019-10-14 ENCOUNTER — HOSPITAL ENCOUNTER (EMERGENCY)
Facility: HOSPITAL | Age: 64
Discharge: HOME | End: 2019-10-15
Attending: FAMILY MEDICINE
Payer: MEDICARE

## 2019-10-14 DIAGNOSIS — I48.91 ATRIAL FIBRILLATION: ICD-10-CM

## 2019-10-14 DIAGNOSIS — I49.9 CARDIAC RHYTHM DISORDER OR DISTURBANCE OR CHANGE: ICD-10-CM

## 2019-10-14 DIAGNOSIS — I48.91 ATRIAL FIBRILLATION WITH RVR: Primary | ICD-10-CM

## 2019-10-14 DIAGNOSIS — R79.89 ELEVATED TROPONIN: ICD-10-CM

## 2019-10-14 DIAGNOSIS — I48.91 A-FIB: ICD-10-CM

## 2019-10-14 LAB
ALBUMIN SERPL BCP-MCNC: 4.2 G/DL (ref 3.5–5.2)
ALP SERPL-CCNC: 99 U/L (ref 38–126)
ALT SERPL W/O P-5'-P-CCNC: 18 U/L (ref 10–44)
ANION GAP SERPL CALC-SCNC: 11 MMOL/L (ref 8–16)
AST SERPL-CCNC: 22 U/L (ref 15–46)
BASOPHILS # BLD AUTO: 0.02 K/UL (ref 0–0.2)
BASOPHILS NFR BLD: 0.2 % (ref 0–1.9)
BILIRUB SERPL-MCNC: 0.4 MG/DL (ref 0.1–1)
BUN SERPL-MCNC: 11 MG/DL (ref 2–20)
CALCIUM SERPL-MCNC: 9.3 MG/DL (ref 8.7–10.5)
CHLORIDE SERPL-SCNC: 101 MMOL/L (ref 95–110)
CO2 SERPL-SCNC: 25 MMOL/L (ref 23–29)
CREAT SERPL-MCNC: 1.03 MG/DL (ref 0.5–1.4)
DIFFERENTIAL METHOD: ABNORMAL
EOSINOPHIL # BLD AUTO: 0.2 K/UL (ref 0–0.5)
EOSINOPHIL NFR BLD: 2.4 % (ref 0–8)
ERYTHROCYTE [DISTWIDTH] IN BLOOD BY AUTOMATED COUNT: 15.9 % (ref 11.5–14.5)
EST. GFR  (AFRICAN AMERICAN): >60 ML/MIN/1.73 M^2
EST. GFR  (NON AFRICAN AMERICAN): >60 ML/MIN/1.73 M^2
GLUCOSE SERPL-MCNC: 167 MG/DL (ref 70–110)
HCT VFR BLD AUTO: 39.2 % (ref 40–54)
HGB BLD-MCNC: 12.7 G/DL (ref 14–18)
LYMPHOCYTES # BLD AUTO: 3.9 K/UL (ref 1–4.8)
LYMPHOCYTES NFR BLD: 47.1 % (ref 18–48)
MCH RBC QN AUTO: 23.8 PG (ref 27–31)
MCHC RBC AUTO-ENTMCNC: 32.4 G/DL (ref 32–36)
MCV RBC AUTO: 74 FL (ref 82–98)
MONOCYTES # BLD AUTO: 0.7 K/UL (ref 0.3–1)
MONOCYTES NFR BLD: 8.6 % (ref 4–15)
NEUTROPHILS # BLD AUTO: 3.4 K/UL (ref 1.8–7.7)
NEUTROPHILS NFR BLD: 41.7 % (ref 38–73)
NT-PROBNP: 1210 PG/ML (ref 5–900)
PLATELET # BLD AUTO: 307 K/UL (ref 150–350)
PMV BLD AUTO: 10.2 FL (ref 9.2–12.9)
POTASSIUM SERPL-SCNC: 3.1 MMOL/L (ref 3.5–5.1)
PROT SERPL-MCNC: 7.6 G/DL (ref 6–8.4)
RBC # BLD AUTO: 5.33 M/UL (ref 4.6–6.2)
SODIUM SERPL-SCNC: 137 MMOL/L (ref 136–145)
TROPONIN I SERPL DL<=0.01 NG/ML-MCNC: 0.02 NG/ML (ref 0.01–0.03)
WBC # BLD AUTO: 8.23 K/UL (ref 3.9–12.7)

## 2019-10-14 PROCEDURE — 85025 COMPLETE CBC W/AUTO DIFF WBC: CPT | Mod: ER

## 2019-10-14 PROCEDURE — 96374 THER/PROPH/DIAG INJ IV PUSH: CPT | Mod: ER

## 2019-10-14 PROCEDURE — 84484 ASSAY OF TROPONIN QUANT: CPT | Mod: ER

## 2019-10-14 PROCEDURE — 96372 THER/PROPH/DIAG INJ SC/IM: CPT | Mod: ER

## 2019-10-14 PROCEDURE — 25000003 PHARM REV CODE 250: Mod: ER | Performed by: FAMILY MEDICINE

## 2019-10-14 PROCEDURE — 93010 ELECTROCARDIOGRAM REPORT: CPT | Mod: 76,,, | Performed by: INTERNAL MEDICINE

## 2019-10-14 PROCEDURE — 93010 EKG 12-LEAD: ICD-10-PCS | Mod: 76,,, | Performed by: INTERNAL MEDICINE

## 2019-10-14 PROCEDURE — 63600175 PHARM REV CODE 636 W HCPCS: Mod: ER | Performed by: FAMILY MEDICINE

## 2019-10-14 PROCEDURE — 99285 EMERGENCY DEPT VISIT HI MDM: CPT | Mod: 25,ER

## 2019-10-14 PROCEDURE — 83880 ASSAY OF NATRIURETIC PEPTIDE: CPT | Mod: ER

## 2019-10-14 PROCEDURE — 93005 ELECTROCARDIOGRAM TRACING: CPT | Mod: ER

## 2019-10-14 PROCEDURE — 80053 COMPREHEN METABOLIC PANEL: CPT | Mod: ER

## 2019-10-14 PROCEDURE — 96375 TX/PRO/DX INJ NEW DRUG ADDON: CPT | Mod: 59,ER

## 2019-10-14 RX ORDER — AMLODIPINE BESYLATE 5 MG/1
10 TABLET ORAL
Status: DISCONTINUED | OUTPATIENT
Start: 2019-10-14 | End: 2019-10-14

## 2019-10-14 RX ORDER — FUROSEMIDE 10 MG/ML
40 INJECTION INTRAMUSCULAR; INTRAVENOUS
Status: COMPLETED | OUTPATIENT
Start: 2019-10-14 | End: 2019-10-14

## 2019-10-14 RX ORDER — ATENOLOL 25 MG/1
50 TABLET ORAL
Status: COMPLETED | OUTPATIENT
Start: 2019-10-14 | End: 2019-10-14

## 2019-10-14 RX ORDER — POTASSIUM CHLORIDE 20 MEQ/1
20 TABLET, EXTENDED RELEASE ORAL
Status: COMPLETED | OUTPATIENT
Start: 2019-10-15 | End: 2019-10-15

## 2019-10-14 RX ORDER — HYDRALAZINE HYDROCHLORIDE 50 MG/1
50 TABLET, FILM COATED ORAL 3 TIMES DAILY
COMMUNITY
End: 2019-10-15

## 2019-10-14 RX ORDER — ASPIRIN 325 MG
325 TABLET ORAL
Status: COMPLETED | OUTPATIENT
Start: 2019-10-14 | End: 2019-10-14

## 2019-10-14 RX ORDER — POTASSIUM CHLORIDE 20 MEQ/1
40 TABLET, EXTENDED RELEASE ORAL
Status: COMPLETED | OUTPATIENT
Start: 2019-10-14 | End: 2019-10-14

## 2019-10-14 RX ORDER — DILTIAZEM HYDROCHLORIDE 5 MG/ML
25 INJECTION INTRAVENOUS
Status: COMPLETED | OUTPATIENT
Start: 2019-10-14 | End: 2019-10-14

## 2019-10-14 RX ADMIN — POTASSIUM CHLORIDE 40 MEQ: 20 TABLET, EXTENDED RELEASE ORAL at 11:10

## 2019-10-14 RX ADMIN — ASPIRIN 325 MG: 325 TABLET, COATED ORAL at 09:10

## 2019-10-14 RX ADMIN — FUROSEMIDE 40 MG: 10 INJECTION, SOLUTION INTRAVENOUS at 10:10

## 2019-10-14 RX ADMIN — DILTIAZEM HYDROCHLORIDE 25 MG: 5 INJECTION INTRAVENOUS at 09:10

## 2019-10-14 RX ADMIN — ATENOLOL 50 MG: 25 TABLET ORAL at 10:10

## 2019-10-15 VITALS
BODY MASS INDEX: 37.91 KG/M2 | RESPIRATION RATE: 19 BRPM | DIASTOLIC BLOOD PRESSURE: 78 MMHG | WEIGHT: 286 LBS | OXYGEN SATURATION: 98 % | SYSTOLIC BLOOD PRESSURE: 172 MMHG | HEIGHT: 73 IN | HEART RATE: 66 BPM | TEMPERATURE: 98 F

## 2019-10-15 LAB
TROPONIN I SERPL DL<=0.01 NG/ML-MCNC: 0.05 NG/ML (ref 0.01–0.03)
TROPONIN I SERPL DL<=0.01 NG/ML-MCNC: 0.06 NG/ML (ref 0.01–0.03)

## 2019-10-15 PROCEDURE — 93010 ELECTROCARDIOGRAM REPORT: CPT | Mod: ,,, | Performed by: INTERNAL MEDICINE

## 2019-10-15 PROCEDURE — 84484 ASSAY OF TROPONIN QUANT: CPT | Mod: 91,ER

## 2019-10-15 PROCEDURE — 25000003 PHARM REV CODE 250: Mod: ER | Performed by: FAMILY MEDICINE

## 2019-10-15 PROCEDURE — 93010 EKG 12-LEAD: ICD-10-PCS | Mod: ,,, | Performed by: INTERNAL MEDICINE

## 2019-10-15 PROCEDURE — 96372 THER/PROPH/DIAG INJ SC/IM: CPT | Mod: ER

## 2019-10-15 PROCEDURE — 93005 ELECTROCARDIOGRAM TRACING: CPT | Mod: ER

## 2019-10-15 PROCEDURE — 63600175 PHARM REV CODE 636 W HCPCS: Mod: ER | Performed by: FAMILY MEDICINE

## 2019-10-15 PROCEDURE — 84484 ASSAY OF TROPONIN QUANT: CPT | Mod: ER

## 2019-10-15 RX ORDER — ENOXAPARIN SODIUM 100 MG/ML
100 INJECTION SUBCUTANEOUS
Status: COMPLETED | OUTPATIENT
Start: 2019-10-15 | End: 2019-10-15

## 2019-10-15 RX ORDER — AMLODIPINE BESYLATE 10 MG/1
10 TABLET ORAL DAILY
Qty: 30 TABLET | Refills: 0 | Status: SHIPPED | OUTPATIENT
Start: 2019-10-15 | End: 2020-10-14

## 2019-10-15 RX ORDER — ASPIRIN 325 MG
325 TABLET, DELAYED RELEASE (ENTERIC COATED) ORAL
Status: DISCONTINUED | OUTPATIENT
Start: 2019-10-15 | End: 2019-10-15

## 2019-10-15 RX ORDER — METOPROLOL SUCCINATE 50 MG/1
50 TABLET, EXTENDED RELEASE ORAL DAILY
Qty: 30 TABLET | Refills: 0 | Status: SHIPPED | OUTPATIENT
Start: 2019-10-15 | End: 2020-10-14

## 2019-10-15 RX ORDER — AMLODIPINE BESYLATE 5 MG/1
10 TABLET ORAL
Status: COMPLETED | OUTPATIENT
Start: 2019-10-15 | End: 2019-10-15

## 2019-10-15 RX ADMIN — ENOXAPARIN SODIUM 100 MG: 100 INJECTION SUBCUTANEOUS at 02:10

## 2019-10-15 RX ADMIN — POTASSIUM CHLORIDE 20 MEQ: 20 TABLET, EXTENDED RELEASE ORAL at 12:10

## 2019-10-15 RX ADMIN — AMLODIPINE BESYLATE 10 MG: 5 TABLET ORAL at 01:10

## 2019-10-15 NOTE — ED PROVIDER NOTES
Encounter Date: 10/14/2019       History     Chief Complaint   Patient presents with    Tachycardia     PT stated he started feeling like his heart was racing after taking his 3rd Hydralazine for the day. PT states he feels light headed. No nausea. Denies SOB. Pt statee he stopped taking Atenolol x1 week ago.     64-year-old male presents with chief complaint tachycardia.  Patient reports he started feeling like his heart was racing and feels lightheaded.  Patient denies any nausea.  Patient denies any chest pain or shortness of breath.  Patient denies having similar complaints in the past.  Does admit stopped taking his atenolol because he did like the way it was making him feel.  Patient is concerned that his blood pressure is always elevated in the 180s even on his medications.  Patient reports sees Dr. Esquivel for his hypertension and diabetes.  Patient admits is poorly compliant with his medication.  He and wife report patient takes his blood pressure frequently about once an hour.        Review of patient's allergies indicates:  No Known Allergies  Past Medical History:   Diagnosis Date    Diabetes mellitus     High cholesterol     Hypertension      History reviewed. No pertinent surgical history.  History reviewed. No pertinent family history.  Social History     Tobacco Use    Smoking status: Never Smoker    Smokeless tobacco: Never Used   Substance Use Topics    Alcohol use: Yes     Alcohol/week: 4.0 standard drinks     Types: 4 Shots of liquor per week     Comment: daily    Drug use: No     Review of Systems   Constitutional: Negative for chills and fever.   Respiratory: Negative for shortness of breath.    Cardiovascular: Negative for chest pain.   Gastrointestinal: Negative for nausea and vomiting.   All other systems reviewed and are negative.      Physical Exam     Initial Vitals [10/14/19 2117]   BP Pulse Resp Temp SpO2   (!) 192/78 (!) 117 20 97.9 °F (36.6 °C) 100 %      MAP       --          Physical Exam    Nursing note and vitals reviewed.  Constitutional: He appears well-developed and well-nourished.   HENT:   Head: Normocephalic and atraumatic.   Eyes: Conjunctivae and EOM are normal. Pupils are equal, round, and reactive to light.   Neck: Normal range of motion. Neck supple.   Cardiovascular: Normal rate and normal heart sounds. An irregularly irregular rhythm present.    Pulmonary/Chest: Breath sounds normal.   Abdominal: Soft. Bowel sounds are normal.   Musculoskeletal: Normal range of motion.   Neurological: He is alert and oriented to person, place, and time. He has normal strength. GCS score is 15. GCS eye subscore is 4. GCS verbal subscore is 5. GCS motor subscore is 6.   Skin: Skin is warm. Capillary refill takes less than 2 seconds.   Psychiatric: He has a normal mood and affect. His behavior is normal. Thought content normal.         ED Course   Critical Care  Date/Time: 10/14/2019 8:10 PM  Performed by: Jose Magdaleno MD  Authorized by: Jose Magdaleno MD   Direct patient critical care time: 15 minutes  Additional history critical care time: 8 minutes  Ordering / reviewing critical care time: 8 minutes  Documentation critical care time: 8 minutes  Consulting other physicians critical care time: 7 minutes  Consult with family critical care time: 6 minutes  Total critical care time (exclusive of procedural time) : 52 minutes  Critical care was necessary to treat or prevent imminent or life-threatening deterioration of the following conditions: circulatory failure and cardiac failure.  Critical care was time spent personally by me on the following activities: discussions with consultants, development of treatment plan with patient or surrogate, evaluation of patient's response to treatment, examination of patient, ordering and review of laboratory studies, ordering and review of radiographic studies, re-evaluation of patient's condition and pulse oximetry.        Labs Reviewed   CBC  W/ AUTO DIFFERENTIAL - Abnormal; Notable for the following components:       Result Value    Hemoglobin 12.7 (*)     Hematocrit 39.2 (*)     Mean Corpuscular Volume 74 (*)     Mean Corpuscular Hemoglobin 23.8 (*)     RDW 15.9 (*)     All other components within normal limits   COMPREHENSIVE METABOLIC PANEL - Abnormal; Notable for the following components:    Potassium 3.1 (*)     Glucose 167 (*)     All other components within normal limits   NT-PRO NATRIURETIC PEPTIDE - Abnormal; Notable for the following components:    NT-proBNP 1210 (*)     All other components within normal limits   TROPONIN I - Abnormal; Notable for the following components:    Troponin I 0.058 (*)     All other components within normal limits   TROPONIN I - Abnormal; Notable for the following components:    Troponin I 0.046 (*)     All other components within normal limits   TROPONIN I   MAGNESIUM     EKG Readings: (Independently Interpreted)   Initial Reading: No STEMI.   Atrial fibrillation with RVR at 111 beats per minute no acute ST segment changes noted LVH by voltage anterior Q-waves.  Abnormal EKG       Imaging Results          X-Ray Chest 1 View (Final result)  Result time 10/14/19 22:12:06    Final result by Jarrett Mcallister MD (10/14/19 22:12:06)                 Impression:      No acute cardiopulmonary disease.      Electronically signed by: Jarrett Mcallister  Date:    10/14/2019  Time:    22:12             Narrative:    EXAMINATION:  XR CHEST 1 VIEW    CLINICAL HISTORY:  Atrial fibrillation    COMPARISON:  06/30/2019.    FINDINGS:  The heart is normal in size.  Lungs are clear.  Aortic atherosclerosis.                                 Medical Decision Making:   Differential Diagnosis:   Afib, electrolye abnormality, MI, sinus tachycardia, drug ingestion  Clinical Tests:   Lab Tests: Ordered and Reviewed  The following lab test(s) were unremarkable: CBC, CMP, Troponin and BNP       <> Summary of Lab: Troponin and BNP elevated liekly indicative  of underlying heart disease possibly heart failure.    Radiological Study: Ordered and Reviewed  Medical Tests: Ordered and Reviewed  ED Management:  Overall all the patients rate quickly controlled with IV diltiazem.  Patient appears to have trace edema which lasix very adequately treated causing diastolic pressure to decrease.  During stay patient continued to deny chest pain but troponin was elevated likely related to demand ischemia especially in light of patient's underlying atherosclerosis even noted on CXR.  After treated of one dose of Lovenox for possible NSTEMI did discuss observation with cardiology who suggested an additional 3 troponin and discharge if unchanged for outpatient cardiology evaluation.   Discussed with patient who initially did not want to be admitted to hospital.  Patient repeat markers did decrease so was discharged in stable and improved condition with some modifications made to blood pressure regiment including adding Norvasc 5mg and Metoprolol XL 50mg for close followup with three days with PCP/Cardiology.   Other:   I have discussed this case with another health care provider.       <> Summary of the Discussion: D/W Dr. Salcedo who advises repeating Troponin and EKG.  If no changes to EKG and Troponin decreases discharge the patient.                        Clinical Impression:       ICD-10-CM ICD-9-CM   1. Atrial fibrillation with RVR I48.91 427.31   2. Atrial fibrillation I48.91 427.31   3. Cardiac rhythm disorder or disturbance or change I49.9 427.9   4. Elevated troponin R79.89 790.6   5. A-fib I48.91 427.31                                Jose Magdaleno MD  10/24/19 0903

## 2019-11-05 ENCOUNTER — OFFICE VISIT (OUTPATIENT)
Dept: CARDIOLOGY | Facility: CLINIC | Age: 64
End: 2019-11-05
Payer: MEDICARE

## 2019-11-05 VITALS
SYSTOLIC BLOOD PRESSURE: 158 MMHG | HEART RATE: 74 BPM | DIASTOLIC BLOOD PRESSURE: 67 MMHG | BODY MASS INDEX: 37.53 KG/M2 | OXYGEN SATURATION: 98 % | WEIGHT: 283.13 LBS | HEIGHT: 73 IN

## 2019-11-05 DIAGNOSIS — I10 ESSENTIAL HYPERTENSION: ICD-10-CM

## 2019-11-05 DIAGNOSIS — E66.01 MORBID OBESITY: ICD-10-CM

## 2019-11-05 PROCEDURE — 3078F PR MOST RECENT DIASTOLIC BLOOD PRESSURE < 80 MM HG: ICD-10-PCS | Mod: CPTII,S$GLB,, | Performed by: STUDENT IN AN ORGANIZED HEALTH CARE EDUCATION/TRAINING PROGRAM

## 2019-11-05 PROCEDURE — 99999 PR PBB SHADOW E&M-EST. PATIENT-LVL III: ICD-10-PCS | Mod: PBBFAC,,, | Performed by: STUDENT IN AN ORGANIZED HEALTH CARE EDUCATION/TRAINING PROGRAM

## 2019-11-05 PROCEDURE — 3077F PR MOST RECENT SYSTOLIC BLOOD PRESSURE >= 140 MM HG: ICD-10-PCS | Mod: CPTII,S$GLB,, | Performed by: STUDENT IN AN ORGANIZED HEALTH CARE EDUCATION/TRAINING PROGRAM

## 2019-11-05 PROCEDURE — 3008F PR BODY MASS INDEX (BMI) DOCUMENTED: ICD-10-PCS | Mod: CPTII,S$GLB,, | Performed by: STUDENT IN AN ORGANIZED HEALTH CARE EDUCATION/TRAINING PROGRAM

## 2019-11-05 PROCEDURE — 3078F DIAST BP <80 MM HG: CPT | Mod: CPTII,S$GLB,, | Performed by: STUDENT IN AN ORGANIZED HEALTH CARE EDUCATION/TRAINING PROGRAM

## 2019-11-05 PROCEDURE — 99999 PR PBB SHADOW E&M-EST. PATIENT-LVL III: CPT | Mod: PBBFAC,,, | Performed by: STUDENT IN AN ORGANIZED HEALTH CARE EDUCATION/TRAINING PROGRAM

## 2019-11-05 PROCEDURE — 3008F BODY MASS INDEX DOCD: CPT | Mod: CPTII,S$GLB,, | Performed by: STUDENT IN AN ORGANIZED HEALTH CARE EDUCATION/TRAINING PROGRAM

## 2019-11-05 PROCEDURE — 99204 OFFICE O/P NEW MOD 45 MIN: CPT | Mod: S$GLB,,, | Performed by: STUDENT IN AN ORGANIZED HEALTH CARE EDUCATION/TRAINING PROGRAM

## 2019-11-05 PROCEDURE — 3077F SYST BP >= 140 MM HG: CPT | Mod: CPTII,S$GLB,, | Performed by: STUDENT IN AN ORGANIZED HEALTH CARE EDUCATION/TRAINING PROGRAM

## 2019-11-05 PROCEDURE — 99204 PR OFFICE/OUTPT VISIT, NEW, LEVL IV, 45-59 MIN: ICD-10-PCS | Mod: S$GLB,,, | Performed by: STUDENT IN AN ORGANIZED HEALTH CARE EDUCATION/TRAINING PROGRAM

## 2019-11-05 RX ORDER — CHLORTHALIDONE 25 MG/1
25 TABLET ORAL DAILY
Qty: 30 TABLET | Refills: 5 | Status: SHIPPED | OUTPATIENT
Start: 2019-11-05 | End: 2020-11-04

## 2019-11-05 NOTE — LETTER
November 5, 2019      Mike Woodson MD  [address] [place] [id number]           [facility] - Cardiology  [address] [place] [address]. [address]  [facility] [address]  Phone: [phone]  Fax: [phone]          Patient: Regulo Beasley   MR Number: [id number]   YOB: 1955   Date of Visit: 11/5/2019       Dear Dr. Mike Woodson:    Thank you for referring Regulo Beasley to me for evaluation. Attached you will find relevant portions of my assessment and plan of care.    If you have questions, please do not hesitate to call me. I look forward to following Regulo Beasley along with you.    Sincerely,    Alfred Mckeon MD    Enclosure  CC:  No Recipients    If you would like to receive this communication electronically, please contact [email or web address] or [phone] to request more information on "[employer] Link access.    For providers and/or their staff who would like to refer a patient to [facility], please contact us through our one-stop-shop provider referral line, [facility], at [phone].    If you feel you have received this communication in error or would no longer like to receive these types of communications, please e-mail [email or web address]

## 2019-11-05 NOTE — PROGRESS NOTES
"   Cardiology Clinic note    Subjective:   Patient ID:  Regulo Beasley is a 64 y.o. male who presents for evaluation of HTN    HPI:   Regulo Beasley  has a past medical history of Diabetes mellitus, High cholesterol, and Hypertension.  New patient.  Referred from the ER, Dr. Woodson due to elevated HTN, ? Of PAF  - review of ecg - looks to be sinus tachy with PAC, No atrial fibrillation  - pt was scheduled to see Dr. Balderas but never did due to scheduling conflicts back in March 2019.    11/5/19: Pt denies any RINCON. He is able to mow his lawn [id number] w/o limitations. Push . No RINCON, no chest pain. No hx of MI, HF.  Pt notes his ER visits occur during panic attacks. He was on 4 bp medications but when his BP was still high he paniced. He has been doing better since being placed on norvasc and toprol. Bp has been as good as it ever has been recently per pt. He is no longer taking losartan or lisinopril due to uneasiness about pill count.    Vitals  Vitals:    11/05/19 1352   BP: (!) 158/67   Pulse: 74   SpO2: 98%   Weight: 128.4 kg (283 lb 1.6 oz)   Height: 6' 1" (1.854 m)       Patient Active Problem List    Diagnosis Date Noted    Essential hypertension 11/05/2019    Morbid obesity 11/05/2019       Patient's Medications   New Prescriptions    CHLORTHALIDONE (HYGROTEN) 25 MG TAB    Take 1 tablet (25 mg total) by mouth once daily.   Previous Medications    AMLODIPINE (NORVASC) 10 MG TABLET    Take 1 tablet (10 mg total) by mouth once daily.    ATORVASTATIN (LIPITOR) 40 MG TABLET    Take 40 mg by mouth once daily.    INSULIN GLARGINE (LANTUS) 100 UNIT/ML INJECTION    Inject 28 Units into the skin once daily.     METFORMIN (GLUCOPHAGE) 1000 MG TABLET    Take 1,000 mg by mouth 2 (two) times daily with meals.    METOPROLOL SUCCINATE (TOPROL-XL) 50 MG 24 HR TABLET    Take 1 tablet (50 mg total) by mouth once daily.   Modified Medications    No medications on file   Discontinued Medications    LISINOPRIL 10 MG " TABLET    Take 10 mg by mouth once daily.    LOSARTAN (COZAAR) 100 MG TABLET    Take 1 tablet (100 mg total) by mouth once daily. For blood pressure         Review of Systems   Constitution: Negative for chills, decreased appetite, malaise/fatigue and weight gain.   HENT: Negative for congestion and ear discharge.    Eyes: Positive for blurred vision. Negative for double vision.   Cardiovascular: Negative for chest pain, cyanosis, dyspnea on exertion, irregular heartbeat, leg swelling, near-syncope, orthopnea, palpitations and syncope.   Respiratory: Negative for cough, shortness of breath and sleep disturbances due to breathing.    Skin: Negative for color change and dry skin.   Musculoskeletal: Negative for joint pain, joint swelling and muscle cramps.   Gastrointestinal: Negative for bloating, heartburn, hematemesis and hematochezia.   Genitourinary: Negative for bladder incontinence and dysuria.   Neurological: Negative for aphonia, excessive daytime sleepiness, dizziness, focal weakness, headaches, light-headedness, loss of balance and weakness.   Psychiatric/Behavioral: Negative for altered mental status, depression and memory loss. The patient is nervous/anxious. The patient does not have insomnia.          Objective:   Physical Exam   Constitutional: He is oriented to person, place, and time. He appears well-developed and well-nourished.   HENT:   Head: Normocephalic and atraumatic.   Eyes: Conjunctivae and EOM are normal.   Neck: Normal range of motion. Neck supple. No JVD present.   Cardiovascular: Normal rate, regular rhythm and normal heart sounds.   No murmur heard.  Pulmonary/Chest: Effort normal and breath sounds normal. No respiratory distress. He has no wheezes. He has no rales.   Abdominal: Soft. Bowel sounds are normal. He exhibits no distension.   Musculoskeletal: Normal range of motion. He exhibits no edema.   Neurological: He is alert and oriented to person, place, and time.   Skin: Skin is  warm and dry. No erythema.   Psychiatric: He has a normal mood and affect. His behavior is normal. Judgment and thought content normal.   Nursing note and vitals reviewed.      Lab Results    Lab Results   Component Value Date     10/14/2019    K 3.1 (L) 10/14/2019     10/14/2019    CO2 25 10/14/2019    BUN 11 10/14/2019    CREATININE 1.03 10/14/2019     (H) 10/14/2019    MG 1.7 11/25/2018    AST 22 10/14/2019    ALT 18 10/14/2019    ALBUMIN 4.2 10/14/2019    PROT 7.6 10/14/2019    BILITOT 0.4 10/14/2019    WBC 8.23 10/14/2019    HGB 12.7 (L) 10/14/2019    HCT 39.2 (L) 10/14/2019    MCV 74 (L) 10/14/2019     10/14/2019    INR 1.1 10/30/2018    TSH 2.420 11/25/2018       Lipid panel  No results found for: CHOL  No results found for: HDL  No results found for: LDLCALC  No results found for: TRIG    Cardiac Studies  Significant Imaging:   ECG:  unchanged from previous tracings, normal sinus rhythm, PAC's noted, LVH.    Cath study : n/a    Assessment:     1. Essential hypertension    2. Morbid obesity        Plan:     1. HTN, essential  - still moderate uncontrolled  - has some nervousness due to pill count  - after some discussion pt is agreeable to add a diuretic for his HTN  - will c/w norvasc 10, toprol 50 and add chlorthalidone 25mg daily  - follow up 2 month for bp check    2. Obesity.  I spent 5-10 minutes asking, assessing, assisting, arranging and advising heart healthy diet improvements. This included low-salt meals, portion control and health food alternatives. I also encourage 30 minutes of moderate exercise 3-4x a week.       Continue with current medical plan and lifestyle changes.  Return sooner for concerns or questions. If symptoms persist go to the ED  Total duration of face to face visit time 30 minutes.  Total time spent counseling greater than fifty percent of total visit time.  Counseling included discussion regarding imaging findings, diagnosis, possibilities, treatment  options, risks and benefits.      No orders of the defined types were placed in this encounter.      Follow up as scheduled. Return to clinic in 2 months   He expressed verbal understanding and agreed with the plan    Thank you for the opportunity to care for this patient. Will be available for questions if needed.     Alfred Mckeon MD [facility]  Interventional Cardiology  [facility]  Pager: [phone]

## 2021-08-16 DIAGNOSIS — U07.1 COVID-19 VIRUS INFECTION: Primary | ICD-10-CM

## 2022-01-04 ENCOUNTER — HOSPITAL ENCOUNTER (EMERGENCY)
Facility: HOSPITAL | Age: 67
Discharge: HOME | End: 2022-01-04
Attending: EMERGENCY MEDICINE
Payer: MEDICARE

## 2022-01-04 VITALS
BODY MASS INDEX: 35.68 KG/M2 | HEART RATE: 86 BPM | WEIGHT: 278 LBS | HEIGHT: 74 IN | TEMPERATURE: 98 F | SYSTOLIC BLOOD PRESSURE: 194 MMHG | OXYGEN SATURATION: 97 % | DIASTOLIC BLOOD PRESSURE: 84 MMHG | RESPIRATION RATE: 19 BRPM

## 2022-01-04 DIAGNOSIS — H57.89 IRRITATION OF LEFT EYE: Primary | ICD-10-CM

## 2022-01-04 DIAGNOSIS — I10 HYPERTENSION, UNSPECIFIED TYPE: ICD-10-CM

## 2022-01-04 LAB — POCT GLUCOSE: 135 MG/DL (ref 70–110)

## 2022-01-04 PROCEDURE — 99283 EMERGENCY DEPT VISIT LOW MDM: CPT | Mod: 25,ER

## 2022-01-04 PROCEDURE — 25000003 PHARM REV CODE 250: Mod: ER | Performed by: EMERGENCY MEDICINE

## 2022-01-04 PROCEDURE — 82962 GLUCOSE BLOOD TEST: CPT | Mod: ER

## 2022-01-04 RX ORDER — AMLODIPINE BESYLATE 5 MG/1
10 TABLET ORAL
Status: COMPLETED | OUTPATIENT
Start: 2022-01-04 | End: 2022-01-04

## 2022-01-04 RX ADMIN — AMLODIPINE BESYLATE 10 MG: 5 TABLET ORAL at 11:01

## 2022-01-05 NOTE — ED PROVIDER NOTES
Encounter Date: 1/4/2022       History     Chief Complaint   Patient presents with    Hypertension     Patient states while laying in bed his left eye started to burn a little bit and was blurry, he checked his bp and it was 193/80 something - no neuro deficits at present      Patient currently presents with concern regarding eye irritation.  He notes that this began not long before arrival.  He is unaware of any foreign body in the eye nor does he report any specific trauma.  He notes that the eye began burning and was briefly blurry but improved following irrigation.  Patient reports additional complaint of elevated blood pressure at home.  He notes that he is due for his nighttime amlodipine but reports a pressure of 193/80 at home.  Patient denies any chest pain, shortness of breath, or other neurological complaints.        Review of patient's allergies indicates:  No Known Allergies  Past Medical History:   Diagnosis Date    Diabetes mellitus     High cholesterol     Hypertension      History reviewed. No pertinent surgical history.  History reviewed. No pertinent family history.  Social History     Tobacco Use    Smoking status: Never Smoker    Smokeless tobacco: Never Used   Substance Use Topics    Alcohol use: Yes     Alcohol/week: 4.0 standard drinks     Types: 4 Shots of liquor per week     Comment: daily    Drug use: No     Review of Systems   Constitutional: Negative for chills and fever.   HENT: Negative for congestion and sore throat.    Respiratory: Negative for chest tightness and shortness of breath.    Cardiovascular: Negative for chest pain and palpitations.   Gastrointestinal: Negative for abdominal pain and vomiting.   Genitourinary: Negative for difficulty urinating and dysuria.   Skin: Negative for color change and rash.   Allergic/Immunologic: Negative for immunocompromised state.   Neurological: Negative for weakness and numbness.   Hematological: Negative for adenopathy.   All other  "systems reviewed and are negative.    Physical Exam     Initial Vitals [01/04/22 2228]   BP Pulse Resp Temp SpO2   (!) 206/81 104 18 99.7 °F (37.6 °C) 100 %      MAP       --         Vitals:    01/04/22 2228 01/04/22 2303 01/04/22 2304 01/04/22 2305   BP: (!) 206/81   (S) (!) 196/82   Pulse: 104 (S) 83  (S) 81   Resp: 18 (S) 19  19   Temp: 99.7 °F (37.6 °C)  (S) 98.1 °F (36.7 °C)    TempSrc:   (S) Oral    SpO2: 100% (S) 97%  (S) 97%   Weight: 126.1 kg (278 lb)      Height: 6' 2" (1.88 m)       01/04/22 2317 01/04/22 2318   BP: (S) (!) 194/84    Pulse:  86   Resp: (S) 19    Temp:     TempSrc:     SpO2:  97%   Weight:     Height:       Physical Exam    Constitutional: He appears well-developed and well-nourished. He is not diaphoretic. No distress.   HENT:   Head: Normocephalic and atraumatic.   Nose: Nose normal.   Mouth/Throat: Oropharynx is clear and moist.   Eyes: Conjunctivae, EOM and lids are normal. Pupils are equal, round, and reactive to light. No scleral icterus.   Neck: Neck supple. No JVD present.   Cardiovascular: Normal rate, regular rhythm, normal heart sounds and intact distal pulses.   Pulmonary/Chest: Breath sounds normal. No respiratory distress.   Musculoskeletal:      Cervical back: Neck supple.     Neurological: He is alert and oriented to person, place, and time. He has normal strength. No cranial nerve deficit or sensory deficit. GCS score is 15. GCS eye subscore is 4. GCS verbal subscore is 5. GCS motor subscore is 6.   Skin: Skin is warm and dry.   Psychiatric: He has a normal mood and affect. His behavior is normal.       ED Course   Procedures  Labs Reviewed   POCT GLUCOSE - Abnormal; Notable for the following components:       Result Value    POCT Glucose 135 (*)     All other components within normal limits          Imaging Results    None          Medications   amLODIPine tablet 10 mg (10 mg Oral Given 1/4/22 2316)     Medical Decision Making:   ED Management:  Patient notes that his eye " irritation has improved and I cannot appreciate evidence of foreign body.  He has no photophobia at present and I think cardial abrasion is very unlikely.  The patient denies any signs suggestive of hypertensive emergency.  All findings were reviewed with the patient/family in detail.  I see no indication of an emergent process beyond that addressed during our encounter but have duly counseled the patient/family regarding the need for prompt follow-up as well as the indications that should prompt immediate return to the emergency room should new or worrisome developments occur.  The patient has additionally been provided with printed information regarding diagnosis as well as instructions regarding follow up and any medications intended to manage the patient's aforementioned conditions.  The patient/family communicates understanding of all this information and all remaining questions and concerns were addressed at this time.                            Clinical Impression:   Final diagnoses:  [H57.89] Irritation of left eye (Primary)  [I10] Hypertension, unspecified type          ED Disposition Condition    Discharge Stable        ED Prescriptions     None        Follow-up Information     Follow up With Specialties Details Why Contact Info    PCP  Schedule an appointment as soon as possible for a visit  for reassessment     [facility] - Emergency Dept Emergency Medicine Go to  As needed, If symptoms worsen [address] [facility] [address] [phone]           Pa Parrish MD  01/05/22 0252

## 2023-01-30 DIAGNOSIS — R10.9 STOMACH ACHE: ICD-10-CM

## 2023-01-30 DIAGNOSIS — M54.9 DORSALGIA: Primary | ICD-10-CM

## 2023-02-02 ENCOUNTER — HOSPITAL ENCOUNTER (OUTPATIENT)
Dept: RADIOLOGY | Facility: HOSPITAL | Age: 68
Discharge: HOME | End: 2023-02-02
Attending: INTERNAL MEDICINE
Payer: MEDICARE

## 2023-02-02 DIAGNOSIS — M54.9 DORSALGIA: ICD-10-CM

## 2023-02-03 ENCOUNTER — HOSPITAL ENCOUNTER (OUTPATIENT)
Dept: RADIOLOGY | Facility: HOSPITAL | Age: 68
Discharge: HOME | End: 2023-02-03
Attending: INTERNAL MEDICINE
Payer: MEDICARE

## 2023-02-03 DIAGNOSIS — R10.9 STOMACH ACHE: ICD-10-CM

## 2023-02-03 PROCEDURE — 72110 X-RAY EXAM L-2 SPINE 4/>VWS: CPT | Mod: TC,FY,PO

## 2023-02-03 PROCEDURE — 72110 XR LUMBAR SPINE AP AND LAT WITH FLEX/EXT: ICD-10-PCS | Mod: 26,,, | Performed by: RADIOLOGY

## 2023-02-03 PROCEDURE — 76700 US EXAM ABDOM COMPLETE: CPT | Mod: 26,,, | Performed by: RADIOLOGY

## 2023-02-03 PROCEDURE — 76700 US EXAM ABDOM COMPLETE: CPT | Mod: TC,PO

## 2023-02-03 PROCEDURE — 72110 X-RAY EXAM L-2 SPINE 4/>VWS: CPT | Mod: 26,,, | Performed by: RADIOLOGY

## 2023-02-03 PROCEDURE — 76700 US ABDOMEN COMPLETE: ICD-10-PCS | Mod: 26,,, | Performed by: RADIOLOGY
